# Patient Record
Sex: MALE | Race: WHITE | NOT HISPANIC OR LATINO
[De-identification: names, ages, dates, MRNs, and addresses within clinical notes are randomized per-mention and may not be internally consistent; named-entity substitution may affect disease eponyms.]

---

## 2019-04-29 PROBLEM — Z00.00 ENCOUNTER FOR PREVENTIVE HEALTH EXAMINATION: Status: ACTIVE | Noted: 2019-04-29

## 2019-05-08 ENCOUNTER — APPOINTMENT (OUTPATIENT)
Dept: UROLOGY | Facility: CLINIC | Age: 54
End: 2019-05-08
Payer: MEDICAID

## 2019-05-08 VITALS
HEIGHT: 66 IN | OXYGEN SATURATION: 96 % | WEIGHT: 177 LBS | BODY MASS INDEX: 28.45 KG/M2 | TEMPERATURE: 97.9 F | DIASTOLIC BLOOD PRESSURE: 94 MMHG | SYSTOLIC BLOOD PRESSURE: 148 MMHG | HEART RATE: 95 BPM

## 2019-05-08 PROCEDURE — 99204 OFFICE O/P NEW MOD 45 MIN: CPT

## 2019-05-08 NOTE — ASSESSMENT
[FreeTextEntry1] : Referred to general surgery for hernia \par check UA, cx\par return after hernia to see what, if any, symptoms persist

## 2019-05-08 NOTE — PHYSICAL EXAM
[General Appearance - Well Developed] : well developed [General Appearance - Well Nourished] : well nourished [Normal Appearance] : normal appearance [Well Groomed] : well groomed [General Appearance - In No Acute Distress] : no acute distress [Heart Rate And Rhythm] : Heart rate and rhythm were normal [Edema] : no peripheral edema [] : no respiratory distress [Respiration, Rhythm And Depth] : normal respiratory rhythm and effort [Exaggerated Use Of Accessory Muscles For Inspiration] : no accessory muscle use [Abdomen Soft] : soft [Abdomen Tenderness] : non-tender [Costovertebral Angle Tenderness] : no ~M costovertebral angle tenderness [FreeTextEntry1] : right inguinal hernia, direct, reducible [Urethral Meatus] : meatus normal [Penis Abnormality] : normal circumcised penis [Urinary Bladder Findings] : the bladder was normal on palpation [Scrotum] : the scrotum was normal [Epididymis] : the epididymides were normal [Testes Tenderness] : no tenderness of the testes [Testes Mass (___cm)] : there were no testicular masses [Prostate Tenderness] : the prostate was not tender [No Prostate Nodules] : no prostate nodules [Normal Station and Gait] : the gait and station were normal for the patient's age [No Palpable Adenopathy] : no palpable adenopathy

## 2019-05-08 NOTE — HISTORY OF PRESENT ILLNESS
[FreeTextEntry1] : CC: Perineal, groin and testicular pain. \par \par HPI: Intermittent bilateral groin pain 2/10.  Right more than left.  Also occasional sharp and intense perineal pain.   Occasional testicular pain, quick onset and resolution, radiating to penis.  Groin pain first started over a year ago.  The other symptoms for a few months.  Intermittent, comes and goes.   No bulge noted. \par \par Associated nocturia 1x, over the last 6 months.    HIV history, not on medications, viral load not detectable per patient.  No gross hematuria.  No history of kidney stones   Creatinine 1.0; UA negative protein, blood, nitrite/LE.  He believes normal PSA ~ age 48. \par \par Duration:  Over a year \par Location:  Bilateral groin, perineum, testes\par Severity:  Mild to moderate \par Stability:  Intermittent \par Alleviating factors:  None \par Exacerbating factors:  None \par Associated symptoms:  As above. \par \par FAMHX: Negative CAP, , no family history of nephrolithiasis\par SURGHX: Denies \par SOCIAL: , quit smoking in 2016\par ROS: negative 10 system review \par

## 2019-05-09 LAB
APPEARANCE: CLEAR
BACTERIA: NEGATIVE
BILIRUBIN URINE: NEGATIVE
BLOOD URINE: NEGATIVE
COLOR: YELLOW
GLUCOSE QUALITATIVE U: NEGATIVE
HYALINE CASTS: 0 /LPF
KETONES URINE: NEGATIVE
LEUKOCYTE ESTERASE URINE: NEGATIVE
MICROSCOPIC-UA: NORMAL
NITRITE URINE: NEGATIVE
PH URINE: 6
PROTEIN URINE: NORMAL
RED BLOOD CELLS URINE: 2 /HPF
SPECIFIC GRAVITY URINE: 1.02
SQUAMOUS EPITHELIAL CELLS: 0 /HPF
UROBILINOGEN URINE: NORMAL
WHITE BLOOD CELLS URINE: 3 /HPF

## 2019-05-10 LAB — BACTERIA UR CULT: NORMAL
